# Patient Record
Sex: FEMALE | Race: ASIAN | Employment: FULL TIME | URBAN - METROPOLITAN AREA
[De-identification: names, ages, dates, MRNs, and addresses within clinical notes are randomized per-mention and may not be internally consistent; named-entity substitution may affect disease eponyms.]

---

## 2023-05-28 ENCOUNTER — HOSPITAL ENCOUNTER (EMERGENCY)
Age: 53
Discharge: HOME | End: 2023-05-28
Attending: EMERGENCY MEDICINE
Payer: COMMERCIAL

## 2023-05-28 VITALS
TEMPERATURE: 98.2 F | HEIGHT: 66 IN | WEIGHT: 138.2 LBS | HEART RATE: 73 BPM | DIASTOLIC BLOOD PRESSURE: 77 MMHG | OXYGEN SATURATION: 98 % | BODY MASS INDEX: 22.21 KG/M2 | SYSTOLIC BLOOD PRESSURE: 139 MMHG | RESPIRATION RATE: 18 BRPM
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
## 2023-05-28 PROCEDURE — 6370000000 HC RX 637 (ALT 250 FOR IP): Performed by: EMERGENCY MEDICINE

## 2023-05-28 PROCEDURE — 6360000002 HC RX W HCPCS: Performed by: EMERGENCY MEDICINE

## 2023-05-28 PROCEDURE — 90471 IMMUNIZATION ADMIN: CPT | Performed by: EMERGENCY MEDICINE

## 2023-05-28 PROCEDURE — 90715 TDAP VACCINE 7 YRS/> IM: CPT | Performed by: EMERGENCY MEDICINE

## 2023-05-28 PROCEDURE — 99284 EMERGENCY DEPT VISIT MOD MDM: CPT

## 2023-05-28 RX ORDER — BACITRACIN ZINC AND POLYMYXIN B SULFATE 500; 1000 [USP'U]/G; [USP'U]/G
OINTMENT TOPICAL ONCE
Status: COMPLETED | OUTPATIENT
Start: 2023-05-28 | End: 2023-05-28

## 2023-05-28 RX ORDER — AMOXICILLIN AND CLAVULANATE POTASSIUM 875; 125 MG/1; MG/1
1 TABLET, FILM COATED ORAL 2 TIMES DAILY
Qty: 20 TABLET | Refills: 0 | Status: SHIPPED | OUTPATIENT
Start: 2023-05-28 | End: 2023-06-07

## 2023-05-28 RX ADMIN — TETANUS TOXOID, REDUCED DIPHTHERIA TOXOID AND ACELLULAR PERTUSSIS VACCINE, ADSORBED 0.5 ML: 5; 2.5; 8; 8; 2.5 SUSPENSION INTRAMUSCULAR at 11:45

## 2023-05-28 RX ADMIN — Medication: at 11:49

## 2023-05-28 ASSESSMENT — PAIN SCALES - GENERAL: PAINLEVEL_OUTOF10: 4

## 2023-05-28 ASSESSMENT — PAIN - FUNCTIONAL ASSESSMENT: PAIN_FUNCTIONAL_ASSESSMENT: 0-10

## 2023-05-28 ASSESSMENT — PAIN DESCRIPTION - ONSET: ONSET: SUDDEN

## 2023-05-28 ASSESSMENT — PAIN DESCRIPTION - LOCATION: LOCATION: BUTTOCKS

## 2023-05-28 ASSESSMENT — PAIN DESCRIPTION - ORIENTATION: ORIENTATION: LEFT

## 2023-05-28 ASSESSMENT — PAIN DESCRIPTION - FREQUENCY: FREQUENCY: CONTINUOUS

## 2023-05-28 ASSESSMENT — PAIN DESCRIPTION - DESCRIPTORS: DESCRIPTORS: THROBBING

## 2023-05-28 ASSESSMENT — PAIN DESCRIPTION - PAIN TYPE: TYPE: ACUTE PAIN

## 2023-09-13 ENCOUNTER — APPOINTMENT (OUTPATIENT)
Dept: CT IMAGING | Age: 53
DRG: 149 | End: 2023-09-13
Payer: COMMERCIAL

## 2023-09-13 ENCOUNTER — HOSPITAL ENCOUNTER (INPATIENT)
Age: 53
LOS: 1 days | Discharge: HOME | DRG: 149 | End: 2023-09-14
Attending: EMERGENCY MEDICINE | Admitting: INTERNAL MEDICINE
Payer: COMMERCIAL

## 2023-09-13 DIAGNOSIS — R42 VERTIGO: Primary | ICD-10-CM

## 2023-09-13 LAB
ANION GAP SERPL CALCULATED.3IONS-SCNC: 13 MMOL/L (ref 3–16)
BASOPHILS # BLD: 0 K/UL (ref 0–0.2)
BASOPHILS NFR BLD: 0.7 %
BUN SERPL-MCNC: 16 MG/DL (ref 7–20)
CALCIUM SERPL-MCNC: 9.6 MG/DL (ref 8.3–10.6)
CHLORIDE SERPL-SCNC: 98 MMOL/L (ref 99–110)
CO2 SERPL-SCNC: 27 MMOL/L (ref 21–32)
CREAT SERPL-MCNC: 0.8 MG/DL (ref 0.6–1.1)
DEPRECATED RDW RBC AUTO: 13.7 % (ref 12.4–15.4)
EKG ATRIAL RATE: 56 BPM
EKG DIAGNOSIS: NORMAL
EKG P AXIS: 77 DEGREES
EKG P-R INTERVAL: 190 MS
EKG Q-T INTERVAL: 448 MS
EKG QRS DURATION: 80 MS
EKG QTC CALCULATION (BAZETT): 432 MS
EKG R AXIS: 85 DEGREES
EKG T AXIS: 73 DEGREES
EKG VENTRICULAR RATE: 56 BPM
EOSINOPHIL # BLD: 0.3 K/UL (ref 0–0.6)
EOSINOPHIL NFR BLD: 4.4 %
GFR SERPLBLD CREATININE-BSD FMLA CKD-EPI: >60 ML/MIN/{1.73_M2}
GLUCOSE SERPL-MCNC: 153 MG/DL (ref 70–99)
HCT VFR BLD AUTO: 41.6 % (ref 36–48)
HGB BLD-MCNC: 13.8 G/DL (ref 12–16)
LYMPHOCYTES # BLD: 1.5 K/UL (ref 1–5.1)
LYMPHOCYTES NFR BLD: 20.6 %
MCH RBC QN AUTO: 28.4 PG (ref 26–34)
MCHC RBC AUTO-ENTMCNC: 33.2 G/DL (ref 31–36)
MCV RBC AUTO: 85.6 FL (ref 80–100)
MONOCYTES # BLD: 0.3 K/UL (ref 0–1.3)
MONOCYTES NFR BLD: 4.3 %
NEUTROPHILS # BLD: 5 K/UL (ref 1.7–7.7)
NEUTROPHILS NFR BLD: 70 %
PLATELET # BLD AUTO: 215 K/UL (ref 135–450)
PMV BLD AUTO: 7.9 FL (ref 5–10.5)
POTASSIUM SERPL-SCNC: 3.7 MMOL/L (ref 3.5–5.1)
RBC # BLD AUTO: 4.86 M/UL (ref 4–5.2)
SODIUM SERPL-SCNC: 138 MMOL/L (ref 136–145)
TROPONIN, HIGH SENSITIVITY: <6 NG/L (ref 0–14)
TROPONIN, HIGH SENSITIVITY: <6 NG/L (ref 0–14)
WBC # BLD AUTO: 7.1 K/UL (ref 4–11)

## 2023-09-13 PROCEDURE — 6360000002 HC RX W HCPCS: Performed by: INTERNAL MEDICINE

## 2023-09-13 PROCEDURE — 1200000000 HC SEMI PRIVATE

## 2023-09-13 PROCEDURE — 85025 COMPLETE CBC W/AUTO DIFF WBC: CPT

## 2023-09-13 PROCEDURE — 80048 BASIC METABOLIC PNL TOTAL CA: CPT

## 2023-09-13 PROCEDURE — 6370000000 HC RX 637 (ALT 250 FOR IP): Performed by: INTERNAL MEDICINE

## 2023-09-13 PROCEDURE — 2580000003 HC RX 258: Performed by: INTERNAL MEDICINE

## 2023-09-13 PROCEDURE — 70450 CT HEAD/BRAIN W/O DYE: CPT

## 2023-09-13 PROCEDURE — 6360000004 HC RX CONTRAST MEDICATION: Performed by: STUDENT IN AN ORGANIZED HEALTH CARE EDUCATION/TRAINING PROGRAM

## 2023-09-13 PROCEDURE — 99285 EMERGENCY DEPT VISIT HI MDM: CPT

## 2023-09-13 PROCEDURE — 70498 CT ANGIOGRAPHY NECK: CPT

## 2023-09-13 PROCEDURE — 96374 THER/PROPH/DIAG INJ IV PUSH: CPT

## 2023-09-13 PROCEDURE — 84484 ASSAY OF TROPONIN QUANT: CPT

## 2023-09-13 PROCEDURE — 93005 ELECTROCARDIOGRAM TRACING: CPT | Performed by: INTERNAL MEDICINE

## 2023-09-13 RX ORDER — LEVOTHYROXINE SODIUM 88 UG/1
TABLET ORAL
COMMUNITY
Start: 2023-09-11

## 2023-09-13 RX ORDER — MECLIZINE HYDROCHLORIDE 25 MG/1
12.5 TABLET ORAL ONCE
Status: COMPLETED | OUTPATIENT
Start: 2023-09-13 | End: 2023-09-13

## 2023-09-13 RX ORDER — ONDANSETRON 2 MG/ML
4 INJECTION INTRAMUSCULAR; INTRAVENOUS ONCE
Status: COMPLETED | OUTPATIENT
Start: 2023-09-13 | End: 2023-09-13

## 2023-09-13 RX ORDER — VITAMIN B COMPLEX
1 CAPSULE ORAL DAILY
COMMUNITY

## 2023-09-13 RX ORDER — SODIUM CHLORIDE, SODIUM LACTATE, POTASSIUM CHLORIDE, AND CALCIUM CHLORIDE .6; .31; .03; .02 G/100ML; G/100ML; G/100ML; G/100ML
1000 INJECTION, SOLUTION INTRAVENOUS ONCE
Status: COMPLETED | OUTPATIENT
Start: 2023-09-13 | End: 2023-09-13

## 2023-09-13 RX ADMIN — ONDANSETRON 4 MG: 2 INJECTION INTRAMUSCULAR; INTRAVENOUS at 18:36

## 2023-09-13 RX ADMIN — SODIUM CHLORIDE, POTASSIUM CHLORIDE, SODIUM LACTATE AND CALCIUM CHLORIDE 1000 ML: 600; 310; 30; 20 INJECTION, SOLUTION INTRAVENOUS at 18:36

## 2023-09-13 RX ADMIN — IOPAMIDOL 75 ML: 755 INJECTION, SOLUTION INTRAVENOUS at 19:37

## 2023-09-13 RX ADMIN — MECLIZINE HYDROCHLORIDE 12.5 MG: 25 TABLET ORAL at 18:38

## 2023-09-13 ASSESSMENT — ENCOUNTER SYMPTOMS
COLOR CHANGE: 0
EYE ITCHING: 0
VOMITING: 1
CHOKING: 0
NAUSEA: 1
SORE THROAT: 0
DIARRHEA: 0
BACK PAIN: 0
RHINORRHEA: 0
ABDOMINAL PAIN: 0
CONSTIPATION: 0
SHORTNESS OF BREATH: 0
CHEST TIGHTNESS: 0
SINUS PRESSURE: 0
EYE DISCHARGE: 0

## 2023-09-13 ASSESSMENT — LIFESTYLE VARIABLES
HOW OFTEN DO YOU HAVE A DRINK CONTAINING ALCOHOL: NEVER
HOW MANY STANDARD DRINKS CONTAINING ALCOHOL DO YOU HAVE ON A TYPICAL DAY: PATIENT DOES NOT DRINK

## 2023-09-13 ASSESSMENT — PAIN - FUNCTIONAL ASSESSMENT: PAIN_FUNCTIONAL_ASSESSMENT: NONE - DENIES PAIN

## 2023-09-13 NOTE — ED TRIAGE NOTES
Pt comes to the ED with dizziness that started this afternoon. Pt states she is unable to open her eyes because of the dizziness. Pt also states she just has no energy, and has had a \"blowing\" sound in her left ear.

## 2023-09-13 NOTE — ED PROVIDER NOTES
[facility]          EM RESIDENT NOTE     Date of evaluation: 9/13/2023    ADDENDUM:      Care of this patient was assumed from ***. The patient was seen for Dizziness (Started this afternoon // unable to open eyes as room is spinning // \"blowing\" sound out of left ear as well) and Fatigue  . The patient's initial evaluation and plan have been discussed with the prior provider who initially evaluated the patient. Nursing Notes, Past Medical Hx, Past Surgical Hx, Social Hx, Allergies, and Family Hx were all reviewed. MEDICAL DECISION MAKING / ASSESSMENT / PLAN     Jovanny Leyva is a 47 y.o. female ***.  ***      - 3-4x this afternoon  - dizziness  - whooshing in ears    Is this patient to be included in the SEP-1 core measure? {Sep-1 Core Yes/No:139004}    Medical Decision Making  Amount and/or Complexity of Data Reviewed  Labs: ordered. ECG/medicine tests: ordered. Risk  Prescription drug management. This patient was also evaluated by the attending physician. All care plans were discussed and agreed upon. Clinical Impression     No diagnosis found. Disposition     PATIENT REFERRED TO:  No follow-up provider specified.     DISCHARGE MEDICATIONS:  New Prescriptions    No medications on file       DISPOSITION          Diagnostic Results and Other Data     RADIOLOGY:  No orders to display       LABS:   Results for orders placed or performed during the hospital encounter of 09/13/23   CBC with Auto Differential   Result Value Ref Range    WBC 7.1 4.0 - 11.0 K/uL    RBC 4.86 4.00 - 5.20 M/uL    Hemoglobin 13.8 12.0 - 16.0 g/dL    Hematocrit 41.6 36.0 - 48.0 %    MCV 85.6 80.0 - 100.0 fL    MCH 28.4 26.0 - 34.0 pg    MCHC 33.2 31.0 - 36.0 g/dL    RDW 13.7 12.4 - 15.4 %    Platelets 281 903 - 532 K/uL    MPV 7.9 5.0 - 10.5 fL    Neutrophils % 70.0 %    Lymphocytes % 20.6 %    Monocytes % 4.3 %    Eosinophils % 4.4 %    Basophils % 0.7 %    Neutrophils Absolute 5.0

## 2023-09-14 ENCOUNTER — APPOINTMENT (OUTPATIENT)
Dept: MRI IMAGING | Age: 53
DRG: 149 | End: 2023-09-14
Payer: COMMERCIAL

## 2023-09-14 VITALS
SYSTOLIC BLOOD PRESSURE: 117 MMHG | RESPIRATION RATE: 16 BRPM | HEART RATE: 52 BPM | TEMPERATURE: 97.4 F | BODY MASS INDEX: 21.53 KG/M2 | WEIGHT: 134 LBS | OXYGEN SATURATION: 6 % | DIASTOLIC BLOOD PRESSURE: 68 MMHG | HEIGHT: 66 IN

## 2023-09-14 LAB
ANION GAP SERPL CALCULATED.3IONS-SCNC: 8 MMOL/L (ref 3–16)
BUN SERPL-MCNC: 10 MG/DL (ref 7–20)
CALCIUM SERPL-MCNC: 9.5 MG/DL (ref 8.3–10.6)
CHLORIDE SERPL-SCNC: 104 MMOL/L (ref 99–110)
CO2 SERPL-SCNC: 30 MMOL/L (ref 21–32)
CREAT SERPL-MCNC: 0.6 MG/DL (ref 0.6–1.1)
GFR SERPLBLD CREATININE-BSD FMLA CKD-EPI: >60 ML/MIN/{1.73_M2}
GLUCOSE SERPL-MCNC: 92 MG/DL (ref 70–99)
POTASSIUM SERPL-SCNC: 3.6 MMOL/L (ref 3.5–5.1)
SODIUM SERPL-SCNC: 142 MMOL/L (ref 136–145)

## 2023-09-14 PROCEDURE — 6360000002 HC RX W HCPCS: Performed by: INTERNAL MEDICINE

## 2023-09-14 PROCEDURE — 36415 COLL VENOUS BLD VENIPUNCTURE: CPT

## 2023-09-14 PROCEDURE — 80048 BASIC METABOLIC PNL TOTAL CA: CPT

## 2023-09-14 PROCEDURE — 2580000003 HC RX 258: Performed by: INTERNAL MEDICINE

## 2023-09-14 PROCEDURE — 6370000000 HC RX 637 (ALT 250 FOR IP): Performed by: INTERNAL MEDICINE

## 2023-09-14 PROCEDURE — 70551 MRI BRAIN STEM W/O DYE: CPT

## 2023-09-14 RX ORDER — MECLIZINE HYDROCHLORIDE 25 MG/1
25 TABLET ORAL 3 TIMES DAILY PRN
Qty: 30 TABLET | Refills: 0 | Status: SHIPPED | OUTPATIENT
Start: 2023-09-14 | End: 2023-09-24

## 2023-09-14 RX ORDER — ONDANSETRON 4 MG/1
4 TABLET, ORALLY DISINTEGRATING ORAL EVERY 8 HOURS PRN
Qty: 30 TABLET | Refills: 0 | Status: SHIPPED | OUTPATIENT
Start: 2023-09-14

## 2023-09-14 RX ORDER — ONDANSETRON 4 MG/1
4 TABLET, ORALLY DISINTEGRATING ORAL EVERY 8 HOURS PRN
Status: DISCONTINUED | OUTPATIENT
Start: 2023-09-14 | End: 2023-09-14 | Stop reason: HOSPADM

## 2023-09-14 RX ORDER — SODIUM CHLORIDE 0.9 % (FLUSH) 0.9 %
5-40 SYRINGE (ML) INJECTION EVERY 12 HOURS SCHEDULED
Status: DISCONTINUED | OUTPATIENT
Start: 2023-09-14 | End: 2023-09-14 | Stop reason: HOSPADM

## 2023-09-14 RX ORDER — ACETAMINOPHEN 650 MG/1
650 SUPPOSITORY RECTAL EVERY 6 HOURS PRN
Status: DISCONTINUED | OUTPATIENT
Start: 2023-09-14 | End: 2023-09-14 | Stop reason: HOSPADM

## 2023-09-14 RX ORDER — SODIUM CHLORIDE 9 MG/ML
INJECTION, SOLUTION INTRAVENOUS PRN
Status: DISCONTINUED | OUTPATIENT
Start: 2023-09-14 | End: 2023-09-14 | Stop reason: HOSPADM

## 2023-09-14 RX ORDER — SODIUM CHLORIDE, SODIUM LACTATE, POTASSIUM CHLORIDE, CALCIUM CHLORIDE 600; 310; 30; 20 MG/100ML; MG/100ML; MG/100ML; MG/100ML
INJECTION, SOLUTION INTRAVENOUS CONTINUOUS
Status: ACTIVE | OUTPATIENT
Start: 2023-09-14 | End: 2023-09-14

## 2023-09-14 RX ORDER — ENOXAPARIN SODIUM 100 MG/ML
40 INJECTION SUBCUTANEOUS DAILY
Status: DISCONTINUED | OUTPATIENT
Start: 2023-09-14 | End: 2023-09-14 | Stop reason: HOSPADM

## 2023-09-14 RX ORDER — MECLIZINE HYDROCHLORIDE 25 MG/1
25 TABLET ORAL 3 TIMES DAILY
Status: DISCONTINUED | OUTPATIENT
Start: 2023-09-14 | End: 2023-09-14 | Stop reason: HOSPADM

## 2023-09-14 RX ORDER — SODIUM CHLORIDE 0.9 % (FLUSH) 0.9 %
5-40 SYRINGE (ML) INJECTION PRN
Status: DISCONTINUED | OUTPATIENT
Start: 2023-09-14 | End: 2023-09-14 | Stop reason: HOSPADM

## 2023-09-14 RX ORDER — POLYETHYLENE GLYCOL 3350 17 G/17G
17 POWDER, FOR SOLUTION ORAL DAILY PRN
Status: DISCONTINUED | OUTPATIENT
Start: 2023-09-14 | End: 2023-09-14 | Stop reason: HOSPADM

## 2023-09-14 RX ORDER — ONDANSETRON 2 MG/ML
4 INJECTION INTRAMUSCULAR; INTRAVENOUS EVERY 6 HOURS PRN
Status: DISCONTINUED | OUTPATIENT
Start: 2023-09-14 | End: 2023-09-14 | Stop reason: HOSPADM

## 2023-09-14 RX ORDER — ACETAMINOPHEN 325 MG/1
650 TABLET ORAL EVERY 6 HOURS PRN
Status: DISCONTINUED | OUTPATIENT
Start: 2023-09-14 | End: 2023-09-14 | Stop reason: HOSPADM

## 2023-09-14 RX ADMIN — ENOXAPARIN SODIUM 40 MG: 100 INJECTION SUBCUTANEOUS at 09:25

## 2023-09-14 RX ADMIN — SODIUM CHLORIDE, POTASSIUM CHLORIDE, SODIUM LACTATE AND CALCIUM CHLORIDE: 600; 310; 30; 20 INJECTION, SOLUTION INTRAVENOUS at 02:34

## 2023-09-14 RX ADMIN — MECLIZINE HYDROCHLORIDE 25 MG: 25 TABLET ORAL at 09:25

## 2023-09-14 NOTE — PROGRESS NOTES
Vital Signs stable  A&O x4  Infusing LR @ 100mL/hr  Voiding freely ambulating independently  Denies pain  MRI done overnight    No further needs identified at this time

## 2023-09-14 NOTE — CARE COORDINATION
11:37 AM  Upon review of patients chart; pt is from home, IPTA, no current home services. Pt will not need transport assistance at time of dc. No CM/SW needs anticipated. CM will sign off at this time. Please consult CM/SW if any dcp needs arise.     Admitted for Dizziness [R42]  Vertigo [R42]  Plan for dc today    Readmission Risk Score: 3.2   Electronically signed by Xu Velasquez RN, CM on 9/14/2023 at 11:37 AM.  Phone: [phone]  Fax: [phone]

## 2023-09-14 NOTE — ED PROVIDER NOTES
ED Attending Attestation Note     Date of evaluation: 9/13/2023    This patient was seen by the advance practice provider. I have seen and examined the patient, agree with the workup, evaluation, management and diagnosis. The care plan has been discussed. I have reviewed the ECG and concur with the KEVIN's interpretation. My assessment reveals a 52-year-old female who presents with a chief complaint of dizziness, fatigue. Patient with symptoms of spinning that started today. General: This is a wd/wn female  in no acute distress who appears their stated age. HEENT: NC/AT. PERRL. OP clear. MMM. Neck: Supple. Trachea midline. Pulmonary: Normal work of breathing, not using accessory muscles or pursed lip breathing    Cardiac: RRR    Abdomen: S/NT/ND/+BS. No cva tenderness. Musculoskeletal: WWP with no clubbing, cyanosis, or deformities noted. Vascular: +2 radial and DP pulses. Skin: Warm and dry with no lesions noted    Neuro:  AAOx4. Face is grossly symmetric. Gait not assessed. Moving all 4 extremities equally and spontaneously  .      Rock Guillen MD  09/13/23 [id number]

## 2023-09-14 NOTE — PROGRESS NOTES
Patient admit to room 5315 from ed. Patient is A&O x 4. VSS. Patient oriented to the room all safety measures in place. Patient given IS and SCDs at this time. Admission orders released and patient 4 eyes completed. Admission documentation completed. No other needs are noted at this time.     [x] Bed alarm on and cord plugged into wall  [x] Bed in lowest position  [x] Call light and bedside table within reach  [x] Patient educated on all safety measures  []Oxygen connected to wall (if applicable)     Nurse 1 Esignature: Electronically signed by Marcus Moran RN on 9/14/23 at 12:54 AM EDT  Nurse 2 Esignature: Electronically signed by Vicki Arteaga RN on 9/14/23 at 6:30 AM EDT

## 2023-09-14 NOTE — DISCHARGE SUMMARY
V2.0  Discharge Summary    Name:  Ramsey Metcalf /Age/Sex: 1970 (47 y.o. female)   Admit Date: 2023  Discharge Date: 23    MRN & CSN:  [id number] & [id number] Encounter Date and Time 23 10:58 AM EDT    Attending:  Alexandria Arias MD Discharging Provider: JEB Ohara - CNP       Hospital Course:     Brief HPI: Ramsey Metcalf is a 47 y.o. female with no significant past medical history who presents with acute onset Dizziness    Brief Problem Based Course:   Neurochecks every 4 hourly- unremarkable  Meclizine 25 mg 3 times daily with antiemetics as needed- improved Sx  MRI brain without contrast- negative for acute ischemic findings  Fall precautions  Gentle IV hydration  Orthostatic vital signs- neg  Neurology consult if indicated after MRI brain  PT for vestibular therapy if MRI is negative- pt refused vestibular therapy- ambulated pt in hallway- Sx resolved  Supportive therapy      The patient expressed appropriate understanding of, and agreement with the discharge recommendations, medications, and plan. Consults this admission:  None    Discharge Diagnosis:   Dizziness      Discharge Instruction:   Follow up appointments: PCP   Primary care physician: No primary care provider on file.  within 2 weeks  Diet: regular diet   Activity: activity as tolerated/ no driving until Sx are completely resolved  Disposition: Discharged to:   []Home, [][facility], []SNF, []Acute Rehab, []Hospice   Condition on discharge: Stable  Labs and Tests to be Followed up as an outpatient by PCP or Specialist:     Discharge Medications:        Medication List        START taking these medications      meclizine 25 MG tablet  Commonly known as: ANTIVERT  Take 1 tablet by mouth 3 times daily as needed for Dizziness     ondansetron 4 MG disintegrating tablet  Commonly known as: ZOFRAN-ODT  Take 1 tablet by mouth every 8 hours as needed for Nausea or Vomiting            CONTINUE taking these medications      b complex signed by Rekha Cutler MD    CTA Head Neck W/Contrast    Result Date: 9/13/2023  CT ANGIOGRAPHY HEAD AND NECK Intracranial and extracranial vasculature INDICATIONS: CVA,  persistent vertigo, eval central etiology Comparison with computed tomography, 9/13/2023 Multiplanar imaging image postprocessing. Mid-thorax the skull base. Skull base to high convexity Volume rendering, Volume rendering, 3-D rendering image postprocessing at an independent workstation, 3-D lab This study was analyzed utilizing the [address] [employer]. ai algorithm Contrast: Isovue 370 80 mL CT radiation dose optimization techniques (automated exposure control, use of a iterative reconstruction techniques, or adjustment of the mA or KV according to the patients size) were used to limit patient radiation dose. * Nascet criteria: % stenosis = 1-(normal lumen - minimal residual lumen divided by normal lumen) x 100 % *Stenotic measurements of the arteries were made using Nascet criteria FINDINGS: NECK: Normal  aortic arch. Right innominate artery is normal. Right common carotid artery is normal. Common carotid bifurcation is normal. Right internal carotid is normal. . Right external carotid artery is patent. Left common carotid artery is normal. Left common carotid bifurcation is normal.. Left internal carotid artery is normal. Cervical segment of the internal carotid artery is normal. Left external carotid artery is normal. Vertebral arteries:. Origin of the left and right vertebral arteries are normal. Cervical segments are patent and symmetric. Subclavian arteries are normal INTRACRANIAL: Vertebrobasilar junction is normal. Basal artery is normal. Left and right posterior cerebral arteries are patent Supraclinoid internal carotid arteries are normal. M1, M2, A1 and A2 branches are normal No evidence of intracranial aneurysm, large vessel occlusion, early draining veins or venous thrombosis Other chronic ethmoid and sphenoid sinusitis.      1. Normal right

## 2023-09-14 NOTE — H&P
V2.0  History and Physical      Name:  Jovanny Leyva /Age/Sex: 1970  (47 y.o. female)   MRN & CSN:  [id number] & [id number] Encounter Date/Time: 2023 9:20 PM EDT   [facility] PCP: No primary care provider on file. Hospital Day: 1    Assessment and Plan:   Jovanny Leyva is a 47 y.o. female with no significant past medical history who presents with acute onset Dizziness    Hospital Problems             Last Modified POA    * (Principal) Dizziness 2023 Yes   #Acute onset dizziness with associated nausea  CT head and CTA head and neck with no acute abnormalities  Need to rule out posterior circulation insult    Plan:  Neurochecks every 4 hourly  Meclizine 25 mg 3 times daily with antiemetics as needed  MRI brain without contrast  Fall precautions  Gentle IV hydration  Orthostatic vital signs  Neurology consult if indicated after MRI brain  PT for vestibular therapy if MRI is negative  Supportive therapy    Disposition:   Current Living situation: Home  Expected Disposition: Home  Estimated D/C: 2-3 days    Diet ADULT DIET; Regular   DVT Prophylaxis [x] Lovenox, []  Heparin, [] SCDs, [] Ambulation,  [] Eliquis, [] Xarelto, [] Coumadin   Code Status Full Code   Surrogate Decision Maker/ POA Patient     Personally reviewed Lab Studies are CBC, BMP and Imaging     Discussed management of the case with ED provider who recommended admission for further work-up and management    EKG interpreted personally and results sinus bradycardia, VR = 56, normal intervals, no acute ST-T changes    Imaging that was interpreted personally includes CT head without contrast and results no acute intracranial abnormality      History from:     patient, electronic medical record    History of Present Illness:     Chief Complaint: Acute onset dizziness  Jovanny Leyva is a 47 y.o. female with no significant past medical history presents with acute onset dizziness that started around noon today.   This is associated Posterior fossa is within normal limits Paranasal sinuses: Bilateral chronic ethmoid sinusitis. Chronic sphenoid sinusitis. . Orbits: Normal Mastoids and Temporal Bones: Normal Skull base and Bony calvarium remains intact, no destructive lesions     1. Normal brain 2.  Chronic sinusitis        Electronically signed by Nic Tuttle MD on 9/13/2023 at 9:20 PM

## 2023-09-14 NOTE — PROGRESS NOTES
4 Eyes Skin Assessment     NAME:  Nicole Villalobos  YOB: 1970  MEDICAL RECORD NUMBER:  [id number]    The patient is being assessed for  Admission    I agree that at least one RN has performed a thorough Head to Toe Skin Assessment on the patient. ALL assessment sites listed below have been assessed. Areas assessed by both nurses:    Head, Face, Ears, Shoulders, Back, Chest, Arms, Elbows, Hands, Sacrum. Buttock, Coccyx, Ischium, Legs. Feet and Heels, and Under Medical Devices         Does the Patient have a Wound?  No noted wound(s)       Cong Prevention initiated by RN: No  Wound Care Orders initiated by RN: No    Pressure Injury (Stage 3,4, Unstageable, DTI, NWPT, and Complex wounds) if present, place Wound referral order by RN under : No    New Ostomies, if present place, Ostomy referral order under : No     Nurse 1 eSignature: Electronically signed by Matt Encinas RN on 9/14/23 at 12:54 AM EDT    **SHARE this note so that the co-signing nurse can place an eSignature**    Nurse 2 eSignature: Electronically signed by Christin Ortiz RN on 9/14/23 at 6:30 AM EDT

## 2023-09-14 NOTE — PLAN OF CARE
Patient discharged. Denies dizziness. IV removed. Walked around nursing unit, gait steady. Reviewed discharge instructions, verbalized understanding. Declined wheelchair transport to exit. Daughter here to transport home.

## 2023-09-21 NOTE — PROGRESS NOTES
Physician Progress Note      Jaret Paige  CSN #:                  [id number]  :                       1970  ADMIT DATE:       2023 5:52 PM  [address] [facility] DATE:        2023 11:48 AM  RESPONDING  PROVIDER #:        Balbina Lott          QUERY TEXT:    Patient admitted with Acute onset of dizziness and noted to be treated with   gentle hydration and Pt/Ot consult for Vestibular therapy. If possible, please   document in progress notes and discharge summary after study the etiology of   the syncope: The medical record reflects the following:  Risk Factors:  Clinical Indicators: Minimal improvement with meclizine and Gentle fluids. Patient refused Vestibular therapy  Treatment: Meclizine, MRI, IVF's  Options provided:  -- dizziness likely due to dehydration  -- dizziness likely due to BPPV  -- Other - I will add my own diagnosis  -- Disagree - Not applicable / Not valid  -- Disagree - Clinically unable to determine / Unknown  -- Refer to Clinical Documentation Reviewer    PROVIDER RESPONSE TEXT:    The dizziness that is likely due to BPPV.     Query created by: Olga Dean on 2023 6:01 AM      Electronically signed by:  Balbina Lott 2023 8:51 AM